# Patient Record
Sex: FEMALE | Race: WHITE | HISPANIC OR LATINO | URBAN - METROPOLITAN AREA
[De-identification: names, ages, dates, MRNs, and addresses within clinical notes are randomized per-mention and may not be internally consistent; named-entity substitution may affect disease eponyms.]

---

## 2022-09-01 ENCOUNTER — OUTPATIENT (OUTPATIENT)
Dept: OUTPATIENT SERVICES | Facility: HOSPITAL | Age: 37
LOS: 1 days | End: 2022-09-01
Payer: SELF-PAY

## 2022-09-01 ENCOUNTER — EMERGENCY (EMERGENCY)
Facility: HOSPITAL | Age: 37
LOS: 1 days | Discharge: HOME | End: 2022-09-01
Attending: STUDENT IN AN ORGANIZED HEALTH CARE EDUCATION/TRAINING PROGRAM | Admitting: INTERNAL MEDICINE
Payer: MEDICAID

## 2022-09-01 ENCOUNTER — APPOINTMENT (OUTPATIENT)
Dept: FAMILY MEDICINE | Facility: HOSPITAL | Age: 37
End: 2022-09-01

## 2022-09-01 VITALS
DIASTOLIC BLOOD PRESSURE: 87 MMHG | HEART RATE: 75 BPM | OXYGEN SATURATION: 98 % | SYSTOLIC BLOOD PRESSURE: 120 MMHG | TEMPERATURE: 99 F | RESPIRATION RATE: 16 BRPM

## 2022-09-01 VITALS
HEART RATE: 78 BPM | HEIGHT: 66.93 IN | OXYGEN SATURATION: 100 % | SYSTOLIC BLOOD PRESSURE: 126 MMHG | WEIGHT: 154 LBS | RESPIRATION RATE: 16 BRPM | BODY MASS INDEX: 24.17 KG/M2 | TEMPERATURE: 98 F | DIASTOLIC BLOOD PRESSURE: 86 MMHG

## 2022-09-01 VITALS
HEART RATE: 72 BPM | DIASTOLIC BLOOD PRESSURE: 78 MMHG | RESPIRATION RATE: 17 BRPM | OXYGEN SATURATION: 98 % | TEMPERATURE: 98 F | SYSTOLIC BLOOD PRESSURE: 116 MMHG | WEIGHT: 154.1 LBS

## 2022-09-01 DIAGNOSIS — Z00.00 ENCOUNTER FOR GENERAL ADULT MEDICAL EXAMINATION WITHOUT ABNORMAL FINDINGS: ICD-10-CM

## 2022-09-01 DIAGNOSIS — Z90.710 ACQUIRED ABSENCE OF BOTH CERVIX AND UTERUS: Chronic | ICD-10-CM

## 2022-09-01 LAB
ALBUMIN SERPL ELPH-MCNC: 3.7 G/DL — SIGNIFICANT CHANGE UP (ref 3.3–5)
ALP SERPL-CCNC: 71 U/L — SIGNIFICANT CHANGE UP (ref 40–120)
ALT FLD-CCNC: 24 U/L — SIGNIFICANT CHANGE UP (ref 10–45)
ANION GAP SERPL CALC-SCNC: 9 MMOL/L — SIGNIFICANT CHANGE UP (ref 5–17)
APPEARANCE UR: CLEAR — SIGNIFICANT CHANGE UP
AST SERPL-CCNC: 17 U/L — SIGNIFICANT CHANGE UP (ref 10–40)
BASOPHILS # BLD AUTO: 0.03 K/UL — SIGNIFICANT CHANGE UP (ref 0–0.2)
BASOPHILS NFR BLD AUTO: 0.4 % — SIGNIFICANT CHANGE UP (ref 0–2)
BILIRUB SERPL-MCNC: 0.2 MG/DL — SIGNIFICANT CHANGE UP (ref 0.2–1.2)
BILIRUB UR-MCNC: NEGATIVE — SIGNIFICANT CHANGE UP
BUN SERPL-MCNC: 17 MG/DL — SIGNIFICANT CHANGE UP (ref 7–23)
CALCIUM SERPL-MCNC: 9.2 MG/DL — SIGNIFICANT CHANGE UP (ref 8.4–10.5)
CHLORIDE SERPL-SCNC: 103 MMOL/L — SIGNIFICANT CHANGE UP (ref 96–108)
CO2 SERPL-SCNC: 26 MMOL/L — SIGNIFICANT CHANGE UP (ref 22–31)
COLOR SPEC: YELLOW — SIGNIFICANT CHANGE UP
CREAT SERPL-MCNC: 0.63 MG/DL — SIGNIFICANT CHANGE UP (ref 0.5–1.3)
DIFF PNL FLD: NEGATIVE — SIGNIFICANT CHANGE UP
EGFR: 117 ML/MIN/1.73M2 — SIGNIFICANT CHANGE UP
EOSINOPHIL # BLD AUTO: 0.22 K/UL — SIGNIFICANT CHANGE UP (ref 0–0.5)
EOSINOPHIL NFR BLD AUTO: 3.1 % — SIGNIFICANT CHANGE UP (ref 0–6)
GLUCOSE SERPL-MCNC: 98 MG/DL — SIGNIFICANT CHANGE UP (ref 70–99)
GLUCOSE UR QL: NEGATIVE — SIGNIFICANT CHANGE UP
HCT VFR BLD CALC: 38.3 % — SIGNIFICANT CHANGE UP (ref 34.5–45)
HGB BLD-MCNC: 12.9 G/DL — SIGNIFICANT CHANGE UP (ref 11.5–15.5)
IMM GRANULOCYTES NFR BLD AUTO: 0.3 % — SIGNIFICANT CHANGE UP (ref 0–1.5)
KETONES UR-MCNC: NEGATIVE — SIGNIFICANT CHANGE UP
LEUKOCYTE ESTERASE UR-ACNC: NEGATIVE — SIGNIFICANT CHANGE UP
LYMPHOCYTES # BLD AUTO: 2.4 K/UL — SIGNIFICANT CHANGE UP (ref 1–3.3)
LYMPHOCYTES # BLD AUTO: 33.9 % — SIGNIFICANT CHANGE UP (ref 13–44)
MCHC RBC-ENTMCNC: 30.5 PG — SIGNIFICANT CHANGE UP (ref 27–34)
MCHC RBC-ENTMCNC: 33.7 GM/DL — SIGNIFICANT CHANGE UP (ref 32–36)
MCV RBC AUTO: 90.5 FL — SIGNIFICANT CHANGE UP (ref 80–100)
MONOCYTES # BLD AUTO: 0.52 K/UL — SIGNIFICANT CHANGE UP (ref 0–0.9)
MONOCYTES NFR BLD AUTO: 7.3 % — SIGNIFICANT CHANGE UP (ref 2–14)
NEUTROPHILS # BLD AUTO: 3.9 K/UL — SIGNIFICANT CHANGE UP (ref 1.8–7.4)
NEUTROPHILS NFR BLD AUTO: 55 % — SIGNIFICANT CHANGE UP (ref 43–77)
NITRITE UR-MCNC: NEGATIVE — SIGNIFICANT CHANGE UP
NRBC # BLD: 0 /100 WBCS — SIGNIFICANT CHANGE UP (ref 0–0)
PH UR: 6 — SIGNIFICANT CHANGE UP (ref 5–8)
PLATELET # BLD AUTO: 267 K/UL — SIGNIFICANT CHANGE UP (ref 150–400)
POTASSIUM SERPL-MCNC: 3.8 MMOL/L — SIGNIFICANT CHANGE UP (ref 3.5–5.3)
POTASSIUM SERPL-SCNC: 3.8 MMOL/L — SIGNIFICANT CHANGE UP (ref 3.5–5.3)
PROT SERPL-MCNC: 7.3 G/DL — SIGNIFICANT CHANGE UP (ref 6–8.3)
PROT UR-MCNC: NEGATIVE — SIGNIFICANT CHANGE UP
RBC # BLD: 4.23 M/UL — SIGNIFICANT CHANGE UP (ref 3.8–5.2)
RBC # FLD: 12.3 % — SIGNIFICANT CHANGE UP (ref 10.3–14.5)
SODIUM SERPL-SCNC: 138 MMOL/L — SIGNIFICANT CHANGE UP (ref 135–145)
SP GR SPEC: 1.01 — SIGNIFICANT CHANGE UP (ref 1.01–1.02)
UROBILINOGEN FLD QL: NEGATIVE — SIGNIFICANT CHANGE UP
WBC # BLD: 7.09 K/UL — SIGNIFICANT CHANGE UP (ref 3.8–10.5)
WBC # FLD AUTO: 7.09 K/UL — SIGNIFICANT CHANGE UP (ref 3.8–10.5)

## 2022-09-01 PROCEDURE — 74177 CT ABD & PELVIS W/CONTRAST: CPT | Mod: 26,MA

## 2022-09-01 PROCEDURE — 96361 HYDRATE IV INFUSION ADD-ON: CPT

## 2022-09-01 PROCEDURE — 87086 URINE CULTURE/COLONY COUNT: CPT

## 2022-09-01 PROCEDURE — 36415 COLL VENOUS BLD VENIPUNCTURE: CPT

## 2022-09-01 PROCEDURE — 80053 COMPREHEN METABOLIC PANEL: CPT

## 2022-09-01 PROCEDURE — 99284 EMERGENCY DEPT VISIT MOD MDM: CPT | Mod: 25

## 2022-09-01 PROCEDURE — G0463: CPT

## 2022-09-01 PROCEDURE — 99285 EMERGENCY DEPT VISIT HI MDM: CPT

## 2022-09-01 PROCEDURE — 96374 THER/PROPH/DIAG INJ IV PUSH: CPT | Mod: XU

## 2022-09-01 PROCEDURE — 74177 CT ABD & PELVIS W/CONTRAST: CPT | Mod: MA

## 2022-09-01 PROCEDURE — 85025 COMPLETE CBC W/AUTO DIFF WBC: CPT

## 2022-09-01 RX ORDER — SODIUM CHLORIDE 9 MG/ML
1000 INJECTION INTRAMUSCULAR; INTRAVENOUS; SUBCUTANEOUS ONCE
Refills: 0 | Status: COMPLETED | OUTPATIENT
Start: 2022-09-01 | End: 2022-09-01

## 2022-09-01 RX ORDER — CITALOPRAM HYDROBROMIDE 20 MG/1
20 TABLET, FILM COATED ORAL DAILY
Qty: 30 | Refills: 1 | Status: ACTIVE | COMMUNITY
Start: 2022-09-01 | End: 1900-01-01

## 2022-09-01 RX ORDER — LEVOTHYROXINE SODIUM 125 MCG
1 TABLET ORAL
Qty: 0 | Refills: 0 | DISCHARGE

## 2022-09-01 RX ORDER — KETOROLAC TROMETHAMINE 30 MG/ML
15 SYRINGE (ML) INJECTION ONCE
Refills: 0 | Status: DISCONTINUED | OUTPATIENT
Start: 2022-09-01 | End: 2022-09-01

## 2022-09-01 RX ADMIN — SODIUM CHLORIDE 1000 MILLILITER(S): 9 INJECTION INTRAMUSCULAR; INTRAVENOUS; SUBCUTANEOUS at 17:42

## 2022-09-01 RX ADMIN — Medication 15 MILLIGRAM(S): at 18:35

## 2022-09-01 RX ADMIN — SODIUM CHLORIDE 1000 MILLILITER(S): 9 INJECTION INTRAMUSCULAR; INTRAVENOUS; SUBCUTANEOUS at 18:39

## 2022-09-01 NOTE — ED PROVIDER NOTE - ATTENDING APP SHARED VISIT CONTRIBUTION OF CARE
Agree with PA assessment and plan. Pt with RLQ pain for 3 months. Started after hysterectomy. VSS. Well appearing. TTP RLQ on exam. Labs unremarkable. CTAP with no signs of appendicitis, intraabdominal infection, ovarian torsion, other emergent etiology. Sx improved with analgesics. Etiology of pain unclear. Will refer to surgeon and gyn for fu.

## 2022-09-01 NOTE — ED PROVIDER NOTE - SKIN NEGATIVE STATEMENT, MLM
Facial laceration, initial encounter no abrasions, no jaundice, no lesions, no pruritis, and no rashes.

## 2022-09-01 NOTE — PLAN
[FreeTextEntry1] : #s/p hysterectomy\par patient to return with operative note and notes from [facility]\par patient reports history of cyst\par US abdomen and Pelvis \par bedside ultrasound performed for educational purposes only, not to be used to influence clinical decisions, Patient to have follow up scan performed \par \par #Depression\par PHQ-9 is 13\par will start citalopram\par SW referral \par \par will send to ED to rule out appendicitis or ovarian torsion \par \par Discussed with Dr. Torres

## 2022-09-01 NOTE — ED PROVIDER NOTE - PATIENT PORTAL LINK FT
You can access the FollowMyHealth Patient Portal offered by [facility] by registering at the following website: [email or web address]. By joining [employer]’s FollowMyHealth portal, you will also be able to view your health information using other applications (apps) compatible with our system.

## 2022-09-01 NOTE — ED PROVIDER NOTE - NS ED MD DISPO DISCHARGE CCDA
Patient/Caregiver provided printed discharge information. Wartpeel Counseling:  I discussed with the patient the risks of Wartpeel including but not limited to erythema, scaling, itching, weeping, crusting, and pain.

## 2022-09-01 NOTE — REVIEW OF SYSTEMS
[Abdominal Pain] : abdominal pain [Depression] : depression [Fever] : no fever [Discharge] : no discharge [Earache] : no earache [Chest Pain] : no chest pain [Shortness Of Breath] : no shortness of breath [Wheezing] : no wheezing [Cough] : no cough [Nausea] : no nausea [Constipation] : no constipation [Vomiting] : no vomiting [Dysuria] : no dysuria [Joint Pain] : no joint pain [Anxiety] : no anxiety [FreeTextEntry7] : last BM was on Monday

## 2022-09-01 NOTE — HISTORY OF PRESENT ILLNESS
[FreeTextEntry8] : 35 y/o F  with hx of MELBA BSO, here with lower abdominal pain that is right sided. she says the pain does not radiate and is a 5/10. She has taken ibuprofen for the pain, which helps bring the pain down to 0. LMP was March. She was told the surgery was for prevention, but she is not sure what they were trying to prevent. As per the patient she has been having this pain since her surgery in April. As per patient she has had thoughts of hurting herself in the past, but denies wanting to hurt herself now. She has been feeling different [place], and says she knows she cannot have the life she had before, but [place] She says at times she can get so down that she is not able to move or do anything. She says she used to take medicine over 6 years ago for this issue.

## 2022-09-01 NOTE — ED PROVIDER NOTE - NS ED ATTENDING STATEMENT MOD
This was a shared visit with the JUDAH. I reviewed and verified the documentation and independently performed the documented:

## 2022-09-01 NOTE — PLAN
[FreeTextEntry1] : #s/p hysterectomy\par patient to return [facility]\par patient reports history of cyst\par US abdomen and Pelvis \par bedside ultrasound performed for educational purposes only, not to be used to influence clinical decisions, Patient to have follow up scan performed \par \par #Depression\par PHQ-9 is 13\par will start citalopram\par SW referral \par \par will send to ED to rule out appendicitis or ovarian torsion \par \par Discussed with Dr. Torres

## 2022-09-01 NOTE — ED PROVIDER NOTE - NSFOLLOWUPCLINICS_GEN_ALL_ED_FT
Family Practice Clinic  Family Medicine  [address] [place] [id number]  Phone: [phone]  Fax:

## 2022-09-01 NOTE — ED PROVIDER NOTE - CLINICAL SUMMARY MEDICAL DECISION MAKING FREE TEXT BOX
36 yr old female with hx of hysterectomy presents from [facility] clinic for right lower abdominal pain. Pt reports intermittent pain since her hysterectomy 3 months ago. Pt seen at another hospital since surgery and was told she had a cyst? Pt sent from  for further eval. Denies any fever, chills, urinary complaints, n/v/d or any other symptoms.    + RLQ tenderness on exam   will check UA, labs, and ct abd/ pelvis 36 yr old female with hx of hysterectomy presents from family practice clinic for right lower abdominal pain. Pt reports intermittent pain since her hysterectomy 3 months ago. Pt seen at another hospital since surgery and was told she had a cyst? Pt sent from  for further eval. Denies any fever, chills, urinary complaints, n/v/d or any other symptoms.    + RLQ tenderness on exam   will check UA, labs, and ct abd/ pelvis   ct and labs reviewed- no acute findings   pt stable for dc and to follow up with family practice.

## 2022-09-01 NOTE — ED PROVIDER NOTE - NSFOLLOWUPINSTRUCTIONS_ED_ALL_ED_FT
Dolor abdominal sharon    LO QUE NECESITA SABER:    ¿Qué necesito saber del dolor abdominal sharon?Generalmente, el dolor abdominal sharon comienza repentinamente y empeora rápidamente.  Órganos abdominales         ¿Qué causa el dolor abdominal sharon?Las siguientes son las causas más comunes:  •Un ataque al corazón      •Dianne reacción alérgica a alimentos o intoxicación por alimentos      •Reflujo gástrico      •Estrés      •Estreñimiento o dianne obstrucción en los intestinos      •Dolor del periodo menstrual en las mujeres      •Inflamación o ruptura de christina apéndice      •Inflamación o infección en el abdomen o un órgano      •Dianne úlcera o un desgarre en el esófago, el estómago o los intestinos      •Sangrado en el abdomen o un órgano      •Cálculos en el riñón o la vesícula biliar      •En las mujeres, enfermedades de las trompas de [place] o de los ovarios, o un embarazo ectópico      ¿Cómo se diagnostica la causa del dolor abdominal sharon?Christina médico lo examinará y le hará preguntas acerca de aliyah síntomas. Informe al médico cuándo comenzaron aliyah síntomas y sobre cualquier viaje o cirugía recientes. También dígale qué mejora o empeora el dolor. Con base en lo que encuentre el médico en el examen y aliyah síntomas, puede que necesite alguno de los siguientes:  •Los análisis de sangrepueden hacerse para revisar si tiene inflamación o para obtener información sobre christina kenneth en general.      •Dianne muestra de materia fecalpuede examinarse para jeanine si usted absorbe los nutrientes de los alimentos que consume. También se puede examinar para detectar gérmenes que pueden estar causando dolor.      •Radiografía, ultrasonido, tomografía computarizada o imagen por resonancia magnética (IRM)podrían usarse para revisar los órganos dentro de christina abdomen. A usted le pueden daniel un líquido de contraste para que los órganos se aprecien mejor en las imágenes. Dígale al médico si usted alguna vez ha tenido dianne reacción alérgica al líquido de contraste. No entre a la jorge donde se realiza la resonancia magnética con algo de metal. El metal puede causar lesiones serias. Dígale al médico si usted tiene algo de metal dentro de christina cuerpo o por encima.      •Dianne endoscopiaes un examen para observar el interior del esófago, el estómago y el intestino franco. Marian dianne endoscopia, los médicos pueden encontrar problemas en el esófago, el estómago o el intestino franco. Algunos problemas se pueden arreglar marian la endoscopia. Pueden tomarse muestras de christina esófago, estómago o intestino franco para enviarlas a un laboratorio para christina examinación.  Endoscopia superior           •Dianne colonoscopiaes un examen para observar dentro del colon. Marian dianne colonoscopia, los médicos pueden encontrar problemas en el colon. Algunos problemas se pueden arreglar marian la colonoscopia. Pueden tomarse muestras de christina colon para enviarlas a un laboratorio para christina examinación.             •Un electrocardiogramagraba christina ritmo cardíaco y la rapidez con la que late christina corazón. Se usa para revisar si hay daños en el corazón.      ¿Cómo se trata el dolor abdominal sharon?El tratamiento depende de la causa del dolor abdominal. Es posible que usted necesite alguno de los siguientes:  •Los medicamentosestos pueden administrarse para disminuir el dolor, tratar dianne infección y manejar aliyah síntomas, tales allen el estreñimiento.      •La cirugíapuede necesitarse para tratar causas graves del dolor abdominal. Los ejemplos incluyen cirugías para tratar dianne apendicitis o dianne obstrucción en los intestinos.      ¿Qué puedo hacer para controlar el dolor abdominal sharon?  •Aplique calorsobre el abdomen de 20 a 30 minutos cada 2 horas por los días que le indiquen. El calor ayuda a disminuir el dolor y los espasmos musculares.      •Mantenga un registro de christina dolor abdominal.[place] puede ayudar a christina médico a saber lo que está causando christina dolor. Incluya cuándo ocurre el dolor, cuánto dura y la sensación causada por el dolor. Registre cualquier otro síntoma que tenga además del dolor abdominal. También registre lo que coma y cualquier síntoma que tenga después de comer.      ¿Qué puedo hacer para prevenir el dolor abdominal sharon?  •Realice cambios en los alimentos que consuma, de ser necesario.No coma alimentos que causan dolor abdominal u otros síntomas. Ingiera comidas pequeñas, más a menudo. Los siguientes cambios también pueden ayudar:?Coma más alimentos ricos en fibra si tiene estreñimiento.Los alimentos altos en fibra incluyen frutas, verduras, alimentos de grano integral y legumbres, allen frijoles pintos.             ?No coma alimentos que causan gas si tiene distensión.Por ejemplo, brócoli, repollo, frijoles y bebidas carbonatadas.      ?No consuma alimentos o bebidas que contienen sorbitol o fructosa si tiene diarrea o distensión.Algunos ejemplos son jugos de frutas, dulces, mermeladas y gomas de mascar sin azúcar.      ?No consuma alimentos altos en grasa.Por ejemplo, comidas fritas, hamburguesas con queso, perros calientes y postres.      •Realice cambios en los líquidos que tome, de ser necesario.No tome líquidos que le causen dolor o lo empeoren, allen el jugo de naranja. [place] líquidos marian el día para mantenerse hidratado. Los siguientes cambios también pueden ayudar:?Zayda suficientes líquidos para evitar la deshidratación causada por la diarrea o los vómitos.Pregunte a christina médico sobre la cantidad de líquido que necesita mark todos los adam y cuáles le recomienda.      ?Limite o no tome cafeína.La cafeína puede empeorar los síntomas, allen la acidez o las náuseas.      ?Limite o no consuma bebidas alcohólicas.El alcohol puede empeorar el dolor abdominal. Pregúntele a christina médico si está geovanny que usted consuma alcohol. Pregunte qué cantidad puede beber. Dianne bebida de alcohol equivale a 12 onzas de cerveza, ½ onza de licor o 5 onzas de vino.      •Controle el estrés.El estrés puede causar dolor abdominal. Christina médico puede recomendarle técnicas de relajación y ejercicios de respiración profunda para ayudar a disminuir el estrés. Hcristina médico puede recomendarle que hable con alguien sobre christina estrés o ansiedad, allen un consejero o un amigo. Duerma lo suficiente. Realice actividad física con regularidad.  Jhonatan afrodescendiente caminando allen ejercicio           •No fume.La nicotina y otros químicos en los cigarrillos pueden dañarle el esófago y el estómago. Pida información a christina médico si usted actualmente fuma y necesita ayuda para dejar de fumar. Los cigarrillos electrónicos o el tabaco sin humo igualmente contienen nicotina. Consulte con christina médico antes de utilizar estos productos.      Llame al número de emergencias local (911 en los [place]) si:  •Tiene alguno de los siguientes signos de un ataque cardíaco: ?Estrujamiento, presión o tensión en christina pecho      ?Usted también podría presentar alguno de los siguientes: ?Malestar o dolor en christina espalda, chaparro, mandíbula, abdomen, o brazo      ?Falta de aliento      ?Náuseas o vómitos      ?Desvanecimiento o sudor frío repentino            ¿Cuándo price buscar atención inmediata?  •Usted no puede dejar de vomitar o vomita vasile.      •Usted tiene vasile en las evacuaciones intestinales o estas tienen aspecto alquitranado.      •Usted tiene sangrado por christina recto.      •El tamaño del abdomen es más sofia de lo normal y se siente gale y más doloroso.      •Tiene dolor abdominal intenso.      •Usted franklin de tener flatulencias y evacuaciones intestinales.      •Usted se siente mareado, débil o tiene sensación de desmayo.      ¿Cuándo price llamar a mi médico?  •Tiene fiebre.      •Tiene nuevos signos y síntomas, o estos empeoran.      •Aliyah síntomas no mejoran con el tratamiento.      •Usted tiene preguntas o inquietudes acerca de christina condición o cuidado.      ACUERDOS SOBRE CHRISTINA CUIDADO:    Usted tiene el derecho de ayudar a planear christina cuidado. Aprenda todo lo que pueda sobre christina condición y allen darle tratamiento. Discuta aliyah opciones de tratamiento con aliyah médicos para decidir el cuidado que usted desea recibir. Usted siempre tiene el derecho de rechazar el tratamiento.       © Copyright [employer] 2022           back to top                          © Copyright [employer] 2022

## 2022-09-01 NOTE — PHYSICAL EXAM
[No Acute Distress] : no acute distress [No Respiratory Distress] : no respiratory distress  [Normal Rate] : normal rate  [Soft] : abdomen soft [Normal Bowel Sounds] : normal bowel sounds [No Rash] : no rash [Normal Affect] : the affect was normal [Alert and Oriented x3] : oriented to person, place, and time [Normal Insight/Judgement] : insight and judgment were intact [de-identified] : RLQ tenderness

## 2022-09-01 NOTE — ED ADULT NURSE NOTE - OBJECTIVE STATEMENT
Pt. from family practice complaining of lower right abdominal pain that she states she has had since she had a total hysterectomy in April.  Pt. states she gets intermittent abdominal distention there as well.  Pt. denies any nausea, vomiting, diarrhea or constipation.  Respirations even and unlabored.  Pt. speaking full sentences.

## 2022-09-01 NOTE — PHYSICAL EXAM
[No Acute Distress] : no acute distress [No Respiratory Distress] : no respiratory distress  [Normal Rate] : normal rate  [Soft] : abdomen soft [Normal Bowel Sounds] : normal bowel sounds [No Rash] : no rash [Normal Affect] : the affect was normal [Alert and Oriented x3] : oriented to person, place, and time [Normal Insight/Judgement] : insight and judgment were intact [de-identified] : RLQ tenderness

## 2022-09-01 NOTE — HISTORY OF PRESENT ILLNESS
[FreeTextEntry8] : 37 y/o F  with hx of MELBA BSO, here with lower abdominal pain that is right sided. she says the pain does not radiate and is a 5/10. She has taken ibuprofen for the pain, which helps bring the pain down to 0. LMP was March. She was told the surgery was for prevention, but she is not sure what they were trying to prevent. As per the patient she has been having this pain since her surgery in April. As per patient she has had thoughts of hurting herself in the past, but denies wanting to hurt herself now. She has been feeling different [place], and says she knows she cannot have the life she had before, but [place] She says at times she can get so down that she is not able to move or do anything. She says she used to take medicine over 6 years ago for this issue.

## 2022-09-02 LAB
CULTURE RESULTS: SIGNIFICANT CHANGE UP
SPECIMEN SOURCE: SIGNIFICANT CHANGE UP

## 2022-09-08 ENCOUNTER — OUTPATIENT (OUTPATIENT)
Dept: OUTPATIENT SERVICES | Facility: HOSPITAL | Age: 37
LOS: 1 days | End: 2022-09-08
Payer: SELF-PAY

## 2022-09-08 ENCOUNTER — MED ADMIN CHARGE (OUTPATIENT)
Age: 37
End: 2022-09-08

## 2022-09-08 ENCOUNTER — APPOINTMENT (OUTPATIENT)
Dept: FAMILY MEDICINE | Facility: HOSPITAL | Age: 37
End: 2022-09-08

## 2022-09-08 DIAGNOSIS — Z23 ENCOUNTER FOR IMMUNIZATION: ICD-10-CM

## 2022-09-08 DIAGNOSIS — F32.A DEPRESSION, UNSPECIFIED: ICD-10-CM

## 2022-09-08 DIAGNOSIS — Z90.710 ACQUIRED ABSENCE OF BOTH CERVIX AND UTERUS: ICD-10-CM

## 2022-09-08 DIAGNOSIS — R10.30 LOWER ABDOMINAL PAIN, UNSPECIFIED: ICD-10-CM

## 2022-09-08 DIAGNOSIS — Z00.00 ENCOUNTER FOR GENERAL ADULT MEDICAL EXAMINATION WITHOUT ABNORMAL FINDINGS: ICD-10-CM

## 2022-09-08 DIAGNOSIS — Z90.710 ACQUIRED ABSENCE OF BOTH CERVIX AND UTERUS: Chronic | ICD-10-CM

## 2022-09-08 PROBLEM — E03.9 HYPOTHYROIDISM, UNSPECIFIED: Chronic | Status: ACTIVE | Noted: 2022-09-01

## 2022-09-08 PROBLEM — D21.9 BENIGN NEOPLASM OF CONNECTIVE AND OTHER SOFT TISSUE, UNSPECIFIED: Chronic | Status: ACTIVE | Noted: 2022-09-01

## 2022-09-08 PROCEDURE — G0463: CPT

## 2022-09-08 RX ORDER — CITALOPRAM HYDROBROMIDE 20 MG/1
20 TABLET, FILM COATED ORAL DAILY
Qty: 90 | Refills: 3 | Status: ACTIVE | COMMUNITY
Start: 2022-09-08 | End: 1900-01-01

## 2022-09-09 DIAGNOSIS — Z90.710 ACQUIRED ABSENCE OF BOTH CERVIX AND UTERUS: ICD-10-CM

## 2022-09-09 DIAGNOSIS — F32.A DEPRESSION, UNSPECIFIED: ICD-10-CM

## 2022-09-10 NOTE — REVIEW OF SYSTEMS
[Fatigue] : fatigue [Abdominal Pain] : abdominal pain [Depression] : depression [Negative] : ENT [Fever] : no fever [Chills] : no chills [Chest Pain] : no chest pain [Palpitations] : no palpitations [Shortness Of Breath] : no shortness of breath [Wheezing] : no wheezing [Cough] : no cough [Nausea] : no nausea [Constipation] : no constipation [Diarrhea] : diarrhea [Vomiting] : no vomiting [Heartburn] : no heartburn [Dysuria] : no dysuria [Incontinence] : no incontinence [Hematuria] : no hematuria [Frequency] : no frequency [Vaginal Discharge] : no vaginal discharge [Joint Pain] : no joint pain [Joint Stiffness] : no joint stiffness [Itching] : no itching [Skin Rash] : no skin rash [Headache] : no headache [Dizziness] : no dizziness [Memory Loss] : no memory loss [Suicidal] : not suicidal [Easy Bleeding] : no easy bleeding

## 2022-09-10 NOTE — ASSESSMENT
[FreeTextEntry1] : 37 yo F with depression and RLQ abdominal pain.\par \par Depression\par - re-send citalopram to pharmacy\par - follow up with social work\par - advised the patient that the medication may take 1-2 months to take full effect\par \par RLQ abdominal pain\par - Persistent RLQ pain, tenderness, without fever, nausea, vomiting, constipation\par - DDx includes adhesions with early SBO, ovarian cyst/torsion. Less likely appendicitis or colitis\par - recommended transvaginal and abdominal ultrasound \par \par RTC in 3 weeks\par \par Discussed with Dr. Burris

## 2022-09-10 NOTE — HISTORY OF PRESENT ILLNESS
[FreeTextEntry1] : abdominal pain and depression [de-identified] : 35 yo F with history of cesarian MELBA is here today for follow up of RLQ abd pain and depression. Regarding the abd pain, the patient was experiencing RLQ abdominal pain at her last visit her Sept 1 which was 1 week ago, was sent to the ED where labs and a abd CT scan were performed. ALl results were negative. Since then, the patient has continued to have dull RLQ pain, which improves with ibuprofen. She has had no fever, no chills, no n/v/d, no constipation, no dysuria, no hematuria. The pain is non radiating and is intermittent. It has improved some but is still present.\par \par Regarding her depression, she was prescribed citalopram 20mg QD but states that she never picked up the medication. Today, she states that she feels the same and is still experiencing low mood, sleep disturbance, decreased appetite, low energy, low concentration. She does not that she was heavier in the past and is scared to gain weight. She denies SI/I/P or HI/I/P. Last PH9 1 week ago was 13, today it is 8.  Her preferred pharmacy is [facility] [address] in [place].\par \par She also notes that she has a thyroid issue but does not know any details.

## 2022-09-10 NOTE — PHYSICAL EXAM
[No Acute Distress] : no acute distress [Well Nourished] : well nourished [Well Developed] : well developed [Well-Appearing] : well-appearing [Normal Sclera/Conjunctiva] : normal sclera/conjunctiva [PERRL] : pupils equal round and reactive to light [EOMI] : extraocular movements intact [Normal Outer Ear/Nose] : the outer ears and nose were normal in appearance [No JVD] : no jugular venous distention [No Respiratory Distress] : no respiratory distress  [No Accessory Muscle Use] : no accessory muscle use [Clear to Auscultation] : lungs were clear to auscultation bilaterally [Normal Rate] : normal rate  [Regular Rhythm] : with a regular rhythm [Normal S1, S2] : normal S1 and S2 [No Murmur] : no murmur heard [No Edema] : there was no peripheral edema [Soft] : abdomen soft [Non-distended] : non-distended [No Masses] : no abdominal mass palpated [No HSM] : no HSM [Normal Bowel Sounds] : normal bowel sounds [No Joint Swelling] : no joint swelling [No Rash] : no rash [Coordination Grossly Intact] : coordination grossly intact [Normal Insight/Judgement] : insight and judgment were intact [de-identified] : mild RLQ TTP to palpation. non-rigid abdomen.

## 2022-09-22 ENCOUNTER — APPOINTMENT (OUTPATIENT)
Dept: FAMILY MEDICINE | Facility: HOSPITAL | Age: 37
End: 2022-09-22

## 2024-03-07 NOTE — ED ADULT TRIAGE NOTE - WEIGHT IN KG
Pt arrived to infusion for Q6 week Tysabri. POC discussed, pt verbalized understanding. Pt reports delay in treatment from last treatment due to insurance. TOUCH program completed with pt online. Pt confirms no new changes, denies s/s of acute illness or MS exacerbation today. PIV placed, brisk blood return noted and line flushes with ease. Tysabri administered per MAR over one hour and pt tolerated well. No s/s of reactions or complications noted. PIV flushed, removed with tip intact and site covered with sterile gauze/coban. Pt confirmed her next appt  emailed for additional appts and pt discharged ambulatory to self care. Pt in NAD at time of discharge.   
69.9